# Patient Record
Sex: MALE | Race: BLACK OR AFRICAN AMERICAN | Employment: FULL TIME | URBAN - METROPOLITAN AREA
[De-identification: names, ages, dates, MRNs, and addresses within clinical notes are randomized per-mention and may not be internally consistent; named-entity substitution may affect disease eponyms.]

---

## 2024-10-25 ENCOUNTER — APPOINTMENT (OUTPATIENT)
Dept: GENERAL RADIOLOGY | Age: 24
End: 2024-10-25
Payer: COMMERCIAL

## 2024-10-25 ENCOUNTER — HOSPITAL ENCOUNTER (EMERGENCY)
Age: 24
Discharge: HOME | End: 2024-10-25
Attending: EMERGENCY MEDICINE
Payer: COMMERCIAL

## 2024-10-25 VITALS
RESPIRATION RATE: 14 BRPM | WEIGHT: 130.4 LBS | HEIGHT: 64 IN | OXYGEN SATURATION: 100 % | TEMPERATURE: 97.9 F | DIASTOLIC BLOOD PRESSURE: 88 MMHG | HEART RATE: 55 BPM | BODY MASS INDEX: 22.26 KG/M2 | SYSTOLIC BLOOD PRESSURE: 133 MMHG

## 2024-10-25 DIAGNOSIS — S91.331A PUNCTURE WOUND OF RIGHT FOOT, INITIAL ENCOUNTER: Primary | ICD-10-CM
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
## 2024-10-25 PROCEDURE — 73630 X-RAY EXAM OF FOOT: CPT

## 2024-10-25 PROCEDURE — 6370000000 HC RX 637 (ALT 250 FOR IP): Performed by: EMERGENCY MEDICINE

## 2024-10-25 PROCEDURE — 99283 EMERGENCY DEPT VISIT LOW MDM: CPT

## 2024-10-25 RX ORDER — IBUPROFEN 600 MG/1
600 TABLET, FILM COATED ORAL ONCE
Status: COMPLETED | OUTPATIENT
Start: 2024-10-25 | End: 2024-10-25

## 2024-10-25 RX ORDER — CIPROFLOXACIN 500 MG/1
500 TABLET, FILM COATED ORAL 2 TIMES DAILY
Qty: 14 TABLET | Refills: 0 | Status: SHIPPED | OUTPATIENT
Start: 2024-10-25 | End: 2024-11-01

## 2024-10-25 RX ORDER — CIPROFLOXACIN 500 MG/1
500 TABLET, FILM COATED ORAL ONCE
Status: COMPLETED | OUTPATIENT
Start: 2024-10-25 | End: 2024-10-25

## 2024-10-25 RX ADMIN — CIPROFLOXACIN 500 MG: 500 TABLET, FILM COATED ORAL at 07:54

## 2024-10-25 RX ADMIN — IBUPROFEN 600 MG: 600 TABLET, FILM COATED ORAL at 07:54

## 2024-10-25 ASSESSMENT — PAIN - FUNCTIONAL ASSESSMENT: PAIN_FUNCTIONAL_ASSESSMENT: NONE - DENIES PAIN

## 2024-10-25 NOTE — DISCHARGE INSTRUCTIONS
Take ciprofloxacin as prescribed to avoid infection.  Take Tylenol or ibuprofen as needed for pain.    Follow-up with [employer] over the next 3 to 5 days for repeat wound check.    Return to the emergency department for worsening foot pain with new numbness or weakness, inability to walk, development of fever, significant pain with moving the toes, or any other concerns over the next 12 to 24 hours.

## 2024-10-25 NOTE — ED NOTES
Bacitracin and kerlex/ coban dressing applied to right foot wound. AVS reviewed with patient and all questions answered. One paper prescription provided to patient.

## 2024-10-25 NOTE — ED PROVIDER NOTES
[facility] EMERGENCY DEPARTMENT  EMERGENCY DEPARTMENT ENCOUNTER        Pt Name: Den Porras  MRN: [id number]  Birthdate 2000  Date of evaluation: 10/25/2024  Provider: Sherwin Rodriguez MD  PCP: [facility] (Inactive)      CHIEF COMPLAINT       Chief Complaint   Patient presents with    Puncture Wound     Pt stepped on glass while working this a.m. Puncture to bottom of right foot.        HISTORY OFPRESENT ILLNESS   (Location/Symptom, Timing/Onset, Context/Setting, Quality, Duration, Modifying Factors,Severity)  Note limiting factors.     Den Porras is a 24 y.o. male presenting today due to concern for stepping on a piece of glass at work which pierced his boot and punctured his right foot.  He is concerned he may still have a foreign body in his right foot.  He has some tingling near the site of the puncture but otherwise denies any numbness or weakness elsewhere on the foot.  He is unsure of his last tetanus shot but is currently declining wanting it updated today, but he does think he has had a tetanus shot at some point in the past.  He is still able to walk on the foot.  Other than concern for the injury at work to the right foot, he has no other complaints at this time.  He denies any allergies to antibiotics.        REVIEW OF SYSTEMS    (2-9 systems for level 4, 10 or more for level 5)     Review of Systems   Musculoskeletal:  Negative for arthralgias and gait problem.   Skin:  Positive for wound (right foot only). Negative for rash.   Neurological:  Positive for numbness (at site of puncture only). Negative for weakness.         Positives and Pertinent negatives as per HPI.      PASTMEDICAL HISTORY   History reviewed. No pertinent past medical history.      SURGICAL HISTORY     History reviewed. No pertinent surgical history.      CURRENT MEDICATIONS       Discharge Medication List as of 10/25/2024  7:55 AM          ALLERGIES     Patient has no known allergies.    FAMILY